# Patient Record
Sex: MALE | Race: WHITE | HISPANIC OR LATINO | Employment: FULL TIME | URBAN - METROPOLITAN AREA
[De-identification: names, ages, dates, MRNs, and addresses within clinical notes are randomized per-mention and may not be internally consistent; named-entity substitution may affect disease eponyms.]

---

## 2018-04-21 ENCOUNTER — HOSPITAL ENCOUNTER (EMERGENCY)
Facility: MEDICAL CENTER | Age: 23
End: 2018-04-21
Attending: EMERGENCY MEDICINE
Payer: OTHER MISCELLANEOUS

## 2018-04-21 VITALS
OXYGEN SATURATION: 95 % | SYSTOLIC BLOOD PRESSURE: 140 MMHG | BODY MASS INDEX: 24.36 KG/M2 | HEIGHT: 68 IN | WEIGHT: 160.72 LBS | RESPIRATION RATE: 16 BRPM | HEART RATE: 72 BPM | TEMPERATURE: 99 F | DIASTOLIC BLOOD PRESSURE: 73 MMHG

## 2018-04-21 DIAGNOSIS — S05.02XA ABRASION OF LEFT CORNEA, INITIAL ENCOUNTER: ICD-10-CM

## 2018-04-21 DIAGNOSIS — T15.02XA FOREIGN BODY OF LEFT CORNEA, INITIAL ENCOUNTER: ICD-10-CM

## 2018-04-21 PROCEDURE — 99284 EMERGENCY DEPT VISIT MOD MDM: CPT

## 2018-04-21 PROCEDURE — 700101 HCHG RX REV CODE 250

## 2018-04-21 RX ORDER — PROPARACAINE HYDROCHLORIDE 5 MG/ML
SOLUTION/ DROPS OPHTHALMIC
Status: COMPLETED
Start: 2018-04-21 | End: 2018-04-21

## 2018-04-21 RX ORDER — PROPARACAINE HYDROCHLORIDE 5 MG/ML
1 SOLUTION/ DROPS OPHTHALMIC ONCE
Status: COMPLETED | OUTPATIENT
Start: 2018-04-21 | End: 2018-04-21

## 2018-04-21 RX ADMIN — PROPARACAINE HYDROCHLORIDE 1 DROP: 5 SOLUTION/ DROPS OPHTHALMIC at 10:30

## 2018-04-21 RX ADMIN — FLUORESCEIN SODIUM: 1 STRIP OPHTHALMIC at 10:30

## 2018-04-21 ASSESSMENT — PAIN SCALES - GENERAL: PAINLEVEL_OUTOF10: 6

## 2018-04-21 NOTE — LETTER
"  FORM C-4:  EMPLOYEE’S CLAIM FOR COMPENSATION/ REPORT OF INITIAL TREATMENT  EMPLOYEE’S CLAIM - PROVIDE ALL INFORMATION REQUESTED   First Name  Robert Last Name  Dino Rader Birthdate             Age  1995 22 y.o. Sex  male Claim Number   Home Employee Address  [address]  [place]                                     Zip  [address] Height  1.727 m (5' 8\") Weight  72.9 kg (160 lb 11.5 oz) [facility]     Mailing Employee Address                           [address]   [place]               Zip  [address] Telephone  [phone] (home)  Primary Language Spoken  ENGLISH   Insurer  Unable to Obtain Third Party   MISC WORKERS COMP Employee's Occupation (Job Title) When Injury or Occupational Disease Occurred     Employer's Name  [employer] Telephone  [phone]    Employer Address  [address] [facility] [place]  [address]   Date of Injury  4/20/2018       Hour of Injury  9:00 AM Date Employer Notified  4/21/2018 Last Day of Work after Injury or Occupational Disease  4/20/2018 Supervisor to Whom Injury Reported  Vel Palenville   Address or Location of Accident (if applicable)  [[address] ]   What were you doing at the time of accident? (if applicable)  working    How did this injury or occupational disease occur? Be specific and answer in detail. Use additional sheet if necessary)  windy day   If you believe that you have an occupational disease, when did you first have knowledge of the disability and it relationship to your employment?  N/A Witnesses to the Accident  N/A     Nature of Injury or Occupational Disease  Workers' Compensation  Part(s) of Body Injured or Affected  Eye (L), N/A, N/A    I certify that the above is true and correct to the best of my knowledge and that I have provided this information in order to obtain the benefits of [place] or " [place]).  I hereby authorize any physician, chiropractor, surgeon, practitioner, or other person, any hospital, including [facility] or [facility] hospital, any medical service organization, any insurance company, or other institution or organization to release to each other, any medical or other information, including benefits paid or payable, pertinent to this injury or disease, except information relative to diagnosis, treatment and/or counseling for AIDS, psychological conditions, alcohol or controlled substances, for which I must give specific authorization.  A Photostat of this authorization shall be as valid as the original.   Date  04/21/2018 Place  [facility] Employee’s Signature   THIS REPORT MUST BE COMPLETED AND MAILED WITHIN 3 WORKING DAYS OF TREATMENT   Place  [facility], EMERGENCY DEPT  Name of Facility   [facility]   Date  4/21/2018 Diagnosis  (T15.02XA) Foreign body of left cornea, initial encounter  (S05.02XA) Abrasion of left cornea, initial encounter Is there evidence the injured employee was under the influence of alcohol and/or another controlled substance at the time of accident?   Hour  10:54 AM Description of Injury or Disease  Foreign body of left cornea, initial encounter  Abrasion of left cornea, initial encounter No   Treatment  Examination in the emergency department and the patient will now go directly to the ophthalmology office for further evaluation and treatment  Have you advised the patient to remain off work five days or more?         No   X-Ray Findings    Comments:n/a   If Yes   From Date    To Date      From information given by the employee, together with medical evidence, can you directly connect this injury or occupational disease as job incurred?  Yes If No, is the employee capable of: Full Duty  No Modified Duty  No   Is additional medical care by a physician  "indicated?  Yes  Comments:going directly to the ophthalmology office now If Modified Duty, Specify any Limitations / Restrictions  Must be evaluated by the ophthalmologist today to determine work status     Do you know of any previous injury or disease contributing to this condition or occupational disease?  No   Date  4/21/2018 Print Doctor’s Name  Pete Medellin certify the employer’s copy of this form was mailed on:   Address  [address] [facility] [address] [phone] Insurer’s Use Only   [facility]  [address]    Provider’s Tax ID Number  [id number] Telephone  Dept: [phone]    Doctor’s Signature  e-PETE Clayton M.D. Degree  MD    Original - TREATING PHYSICIAN OR CHIROPRACTOR   Pg 2-Insurer/TPA   Pg 3-Employer   Pg 4-Employee                                                                                                  Form C-4 (rev01/03)     BRIEF DESCRIPTION OF RIGHTS AND BENEFITS  (Pursuant to [place] 616C.050)    Notice of Injury or Occupational Disease (Incident Report Form C-1): If an injury or occupational disease (OD) arises out of and in the course of employment, you must provide written notice to your employer as soon as practicable, but no later than 7 days after the accident or OD. Your employer shall maintain a sufficient supply of the required forms.    Claim for Compensation (Form C-4): If medical treatment is sought, the form C-4 is available at the place of initial treatment. A completed \"Claim for Compensation\" (Form C-4) must be filed within 90 days after an accident or OD. The treating physician or chiropractor must, within 3 working days after treatment, complete and mail to the employer, the employer's insurer and third-party , the Claim for Compensation.    Medical Treatment: If you require medical treatment for your on-the-job injury or OD, you may be required to select a physician or chiropractor from a list provided by your workers’ " compensation insurer, if it has contracted with an Organization for Managed Care (MCO) or Preferred Provider Organization (PPO) or providers of health care. If your employer has not entered into a contract with an MCO or PPO, you may select a physician or chiropractor from the Panel of Physicians and Chiropractors. Any medical costs related to your industrial injury or OD will be paid by your insurer.    Temporary Total Disability (TTD): If your doctor has certified that you are unable to work for a period of at least 5 consecutive days, or 5 cumulative days in a 20-day period, or places restrictions on you that your employer does not accommodate, you may be entitled to TTD compensation.    Temporary Partial Disability (TPD): If the wage you receive upon reemployment is less than the compensation for TTD to which you are entitled, the insurer may be required to pay you TPD compensation to make up the difference. TPD can only be paid for a maximum of 24 months.    Permanent Partial Disability (PPD): When your medical condition is stable and there is an indication of a PPD as a result of your injury or OD, within 30 days, your insurer must arrange for an evaluation by a rating physician or chiropractor to determine the degree of your PPD. The amount of your PPD award depends on the date of injury, the results of the PPD evaluation and your age and wage.    Permanent Total Disability (PTD): If you are medically certified by a treating physician or chiropractor as permanently and totally disabled and have been granted a PTD status by your insurer, you are entitled to receive monthly benefits not to exceed 66 2/3% of your average monthly wage. The amount of your PTD payments is subject to reduction if you previously received a PPD award.    Vocational Rehabilitation Services: You may be eligible for vocational rehabilitation services if you are unable to return to the job due to a permanent physical impairment or  permanent restrictions as a result of your injury or occupational disease.    Transportation and Per Palmer Reimbursement: You may be eligible for travel expenses and per palmer associated with medical treatment.  Reopening: You may be able to reopen your claim if your condition worsens after claim closure.    Appeal Process: If you disagree with a written determination issued by the insurer or the insurer does not respond to your request, you may appeal to the Department of Administration, , by following the instructions contained in your determination letter. You must appeal the determination within 70 days from the date of the determination letter at [address], [address], [place], [place] [address], or [address], [address], [place], [place] [address]. If you disagree with the  decision, you may appeal to the Department of Administration, . You must file your appeal within 30 days from the date of the  decision letter at [address], [address], [place], [place] [address], or [address], [address], [place], [place] [address]. If you disagree with a decision of an , you may file a petition for judicial review with the District Court. You must do so within 30 days of the Appeal Officer’s decision. You may be represented by an  at your own expense or you may contact the [facility] for possible representation.    [place]  for Injured Workers (NAIW): If you disagree with a  decision, you may request that NAIW represent you without charge at an  Hearing. For information regarding denial of benefits, you may contact the [facility] at: [address], [address], [place] [address], [phone]) [phone], or [address], [facility] [address], [place] [address], [phone]    To File a Complaint with the Division: If you wish to file a complaint with the  of the  Division of Industrial Relations (DIR), please contact the Workers’ Compensation Section, [address], [address], [place], [place] [address], telephone (072) 135-6330, or [address], [address], [place], [place] [address], telephone (056) 650-3971.    For assistance with Workers’ Compensation Issues: you may contact the Office of the [facility] [employer] Health Assistance, [address] [place] [address], [address], [place], [place] [address], Toll Free 1-970.945.7964, Web site: http://[employer].[facility].nv., E-mail [email or web address].                                                                                                                                                                               __________________________________________________________________                                    04/21/2018            Employee Name / Signature                                                                                                                            Date                                       D-2 (rev. 10/07)

## 2018-04-21 NOTE — ED NOTES
ERP at bedside. Pt agrees with plan of care discussed by ERP. AIDET acknowledged with patient. Manpreet in low position, side rail up for pt safety. Call light within reach. Will continue to monitor.

## 2018-04-21 NOTE — ED NOTES
Discharge instructions provided.  Pt verbalized the understanding of discharge instructions to follow up with Opthalmology today, PCP and to return to ER if condition worsens.  Pt ambulated out of ER without difficulty.

## 2018-04-21 NOTE — ED PROVIDER NOTES
"ED Provider Note    CHIEF COMPLAINT  Chief Complaint   Patient presents with   • Foreign Body in Eye     rock in left eye since yesterday while at work       HPI  Robert Rader is a 22 y.o. male who presents to the emergency department with a foreign body in the left eye. 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 The patient has attempted to rinse his left eye with over the counter eye wash but he still feels that there is something in his eye today. Earlier today he went to a [place] urgent care and then was told to come to this emergency department. The patient does not wear glasses or contact lenses.    REVIEW OF SYSTEMS no right eye involvement no other injury or mechanism    PAST MEDICAL HISTORY  History reviewed. No pertinent past medical history.    FAMILY HISTORY  History reviewed. No pertinent family history.    SOCIAL HISTORY  Social History     Social History   • Marital status: N/A     Spouse name: N/A   • Number of children: N/A   • Years of education: N/A     Social History Main Topics   • Smoking status: Never Smoker   • Smokeless tobacco: Not on file   • Alcohol use Yes      Comment: sometimes   • Drug use: No   • Sexual activity: Not on file     Other Topics Concern   • Not on file     Social History Narrative   • No narrative on file       SURGICAL HISTORY  History reviewed. No pertinent surgical history.    CURRENT MEDICATIONS  Home Medications     Reviewed by Susie Alvares (Pharmacy Tech) on 04/21/18 at 1015  Med List Status: Complete   Medication Last Dose Status        Patient Neto Taking any Medications                       ALLERGIES  No Known Allergies    PHYSICAL EXAM  VITAL SIGNS: /77   Pulse 68   Temp 37.2 °C (99 °F)   Resp 19   Ht 1.727 m (5' 8\")   Wt 72.9 kg (160 lb 11.5 oz)   BMI 24.44 kg/m²    Constitutional: Awake well-appearing individual moderately uncomfortable  Eyes: The " left eye was examined with a slit lamp and I also inverted the upper and lower lids I didn't see any retained foreign bodies under the lids however on slit lamp examination I was able to see a black speck of dirt embedded into the cornea at approximately the 8:00 position overlying the iris. There is an area of surrounding corneal abrasion. I do not see any definite rupture or ulceration there is moderate injection of the eye    PROCEDURES  Proparacaine eyedrops were placed in the eye and this was very helpful but the patient said he was still not able to hold the eye open to read the eye chart. Using the slit lamp and an eye spud I attempted to remove the foreign body but was not successful it was very firmly embedded.    MEDICAL DECISION MAKING and DISPOSITION  I have reviewed the case with Dr. Stern who is on-call for ophthalmology today and he will meet the patient in his office in 15 minutes for further evaluation and treatment. I have reviewed this with the patient and he is agreeable with this plan. I have filled out a C4 work injury form indicating that his work status will have to be determined following his ophthalmology evaluation.    IMPRESSION  1. Embedded foreign body, left eye  2. Left eye corneal abrasion         Electronically signed by: Pete Medellin, 4/21/2018 10:46 AM

## 2019-03-07 ENCOUNTER — OCCUPATIONAL MEDICINE (OUTPATIENT)
Dept: URGENT CARE | Facility: PHYSICIAN GROUP | Age: 24
End: 2019-03-07
Payer: COMMERCIAL

## 2019-03-07 VITALS
TEMPERATURE: 98.6 F | SYSTOLIC BLOOD PRESSURE: 124 MMHG | WEIGHT: 150 LBS | BODY MASS INDEX: 21.47 KG/M2 | HEIGHT: 70 IN | HEART RATE: 72 BPM | OXYGEN SATURATION: 98 % | RESPIRATION RATE: 12 BRPM | DIASTOLIC BLOOD PRESSURE: 82 MMHG

## 2019-03-07 DIAGNOSIS — S05.02XA CORNEAL ABRASION, LEFT, INITIAL ENCOUNTER: ICD-10-CM

## 2019-03-07 PROCEDURE — 99203 OFFICE O/P NEW LOW 30 MIN: CPT | Mod: 29 | Performed by: PHYSICIAN ASSISTANT

## 2019-03-07 RX ORDER — ERYTHROMYCIN 5 MG/G
OINTMENT OPHTHALMIC
Qty: 1 TUBE | Refills: 0 | Status: SHIPPED | OUTPATIENT
Start: 2019-03-07

## 2019-03-07 ASSESSMENT — ENCOUNTER SYMPTOMS
PHOTOPHOBIA: 0
BLURRED VISION: 0
EYE REDNESS: 1
EYE PAIN: 1
DOUBLE VISION: 0

## 2019-03-07 NOTE — LETTER
[facility] [address] [address]  Phone:  [phone] - Fax:  [phone]   [facility] Progress Report and Disability Certification  Date of Service: 3/7/2019   No Show:  No  Date / Time of Next Visit: 3/10/2019   Claim Information   Patient Name: Robert Rader  Claim Number:     Employer:   [employer] Date of Injury: 3/7/2019     Insurer / TPA: [employer]  ID / SSN:     Occupation:   Diagnosis: The encounter diagnosis was Corneal abrasion, left, initial encounter.    Medical Information   Related to Industrial Injury? Yes    Subjective Complaints:  DOI: 3/7/2019.  PT presents to clinic with co foreign body in his left eye that blew into his eye over the top of his safety goggles. Pt states this occurred just prior to arrival today.  PT states he used some eye drops to see if he could flush it out, but no change.   States his eye will not stop watering so his vision is a little blurry but thinks it is from the tears.  PT does not wear glasses or contact lenses.  Denies second job.    Objective Findings: Left eye exam: fluorescein exam reveals    Pre-Existing Condition(s):     Assessment:   Initial Visit    Status: Additional Care Required  Permanent Disability:No    Plan: Medication    Diagnostics:      Comments:       Disability Information   Status: Released to Full Duty    From:  3/7/2019  Through: 3/10/2019 Restrictions are:     Physical Restrictions   Sitting:    Standing:    Stooping:    Bending:      Squatting:    Walking:    Climbing:    Pushing:      Pulling:    Other:    Reaching Above Shoulder (L):   Reaching Above Shoulder (R):       Reaching Below Shoulder (L):    Reaching Below Shoulder (R):      Not to exceed Weight Limits   Carrying(hrs):   Weight Limit(lb):   Lifting(hrs):   Weight  Limit(lb):     Comments: Use medications 3 times daily as prescribed.  Continue to wear safety glasses  while at work.     Repetitive Actions   Hands: i.e. Fine Manipulations from Grasping:     Feet: i.e. Operating Foot Controls:     Driving / Operate Machinery:     Physician Name: Sakshi Sanchez P.A.-C. Physician Signature: SAKSHI De Luna P.A.-C. e-Signature: Dr. Lee Cortes, Medical Director   Clinic Name / Location: [address] [address] Clinic Phone Number: Dept: [phone]   Appointment Time: 9:10 Am Visit Start Time: 9:29 AM   Check-In Time:  9:13 Am Visit Discharge Time:  1049AM   Original-Treating Physician or Chiropractor    Page 2-Insurer/TPA    Page 3-Employer    Page 4-Employee

## 2019-03-07 NOTE — LETTER
"EMPLOYEE’S CLAIM FOR COMPENSATION/ REPORT OF INITIAL TREATMENT  FORM C-4    EMPLOYEE’S CLAIM - PROVIDE ALL INFORMATION REQUESTED   First Name  Robert Last Name  Dino Rader Birthdate                    1995                Sex  male Claim Number   Home Address  [address] Age  23 y.o. Height  1.778 m (5' 10\") Weight  68 kg (150 lb) [facility]     [facility] Zip  [address] Telephone  [phone] (home)    Mailing Address  [address] [facility] [place]  [address] Primary Language Spoken  English    Insurer  [employer] Third Party   [facility]   Employee's Occupation (Job Title) When Injury or Occupational Disease Occurred      Employer's Name    [employer] Telephone   [phone]   Employer Address   [address]  [facility] Zip   [address]   Date of Injury  3/7/2019               Hour of Injury  7:30 AM Date Employer Notified  3/7/2019 Last Day of Work after Injury or Occupational Disease  3/7/2019 Supervisor to Whom Injury Reported  Vel   Address or Location of Accident (if applicable)  [[place] [address]]   What were you doing at the time of accident? (if applicable)  instaling [employer]    How did this injury or occupational disease occur? (Be specific an answer in detail. Use additional sheet if necessary)  it was windy, some dust got into my eye   If you believe that you have an occupational disease, when did you first have knowledge of the disability and it relationship to your employment?   Witnesses to the Accident  vel      Nature of Injury or Occupational Disease  Foreign Body  Part(s) of Body Injured or Affected  Eye (L), ,     I certify that the above is true and correct to the best of my knowledge and that I have provided this information in order to obtain the benefits of [place] " [place]  I hereby authorize any physician, chiropractor, surgeon, practitioner, or other person, any hospital, including [facility] or [facility], any medical service organization, any insurance company, or other institution or organization to release to each other, any medical or other information, including benefits paid or payable, pertinent to this injury or disease, except information relative to diagnosis, treatment and/or counseling for AIDS, psychological conditions, alcohol or controlled substances, for which I must give specific authorization.  A Photostat of this authorization shall be as valid as the original.     Date   Place   Employee’s Signature   THIS REPORT MUST BE COMPLETED AND MAILED WITHIN 3 WORKING DAYS OF TREATMENT   Place  [facility]  Name of Facility  [facility]   Date  3/7/2019 Diagnosis  (S05.02XA) Corneal abrasion, left, initial encounter Is there evidence the injured employee was under the influence of alcohol and/or another controlled substance at the time of accident?   Hour  9:29 AM Description of Injury or Disease  The encounter diagnosis was Corneal abrasion, left, initial encounter. No   Treatment  Erythromycin ointment to left eye 3 times daily as directed.  Continue to wear safety goggles/glasses at work.   Have you advised the patient to remain off work five days or more? No   X-Ray Findings      If Yes   From Date  To Date      From information given by the employee, together with medical evidence, can you directly connect this injury or occupational disease as job incurred?  Yes If No Full Duty  Yes Modified Duty      Is additional medical care by a physician indicated?  Yes If Modified Duty, Specify any Limitations / Restrictions      Do you know of any previous injury or disease contributing to this condition or occupational disease?                            No  "  Date  3/7/2019 Print Doctor’s Name Sakshi Sanchez P.A.-C. I certify the employer’s copy of  this form was mailed on:   Address  [address] Insurer’s Use Only     [facility] Zip  [address]    Provider’s Tax ID Number  [id number] Telephone  Dept: [phone]        e-SignSSAKSHI SERRANO P.A.-C.   e-Signature: Dr. Lee Cortes, Medical Director Degree  [place]OR[place]        ORIGINAL-TREATING PHYSICIAN OR CHIROPRACTOR    PAGE 2-INSURER/TPA    PAGE 3-EMPLOYER    PAGE 4-EMPLOYEE             Form C-4 (rev10/07)              BRIEF DESCRIPTION OF RIGHTS AND BENEFITS  (Pursuant to [place] 616C.050)    Notice of Injury or Occupational Disease (Incident Report Form C-1): If an injury or occupational disease (OD) arises out of and in the  course of employment, you must provide written notice to your employer as soon as practicable, but no later than 7 days after the accident or  OD. Your employer shall maintain a sufficient supply of the required forms.    Claim for Compensation (Form C-4): If medical treatment is sought, the form C-4 is available at the place of initial treatment. A completed  \"Claim for Compensation\" (Form C-4) must be filed within 90 days after an accident or OD. The treating physician or chiropractor must,  within 3 working days after treatment, complete and mail to the employer, the employer's insurer and third-party , the Claim for  Compensation.    Medical Treatment: If you require medical treatment for your on-the-job injury or OD, you may be required to select a physician or  chiropractor from a list provided by your workers’ compensation insurer, if it has contracted with an Organization for Managed Care (MCO) or  Preferred Provider Organization (PPO) or providers of health care. If your employer has not entered into a contract with an MCO or PPO, you  may select a physician or chiropractor from the Panel of Physicians and Chiropractors. Any medical costs related to your " industrial injury or  OD will be paid by your insurer.    Temporary Total Disability (TTD): If your doctor has certified that you are unable to work for a period of at least 5 consecutive days, or 5  cumulative days in a 20-day period, or places restrictions on you that your employer does not accommodate, you may be entitled to TTD  compensation.    Temporary Partial Disability (TPD): If the wage you receive upon reemployment is less than the compensation for TTD to which you are  entitled, the insurer may be required to pay you TPD compensation to make up the difference. TPD can only be paid for a maximum of 24  months.    Permanent Partial Disability (PPD): When your medical condition is stable and there is an indication of a PPD as a result of your injury or  OD, within 30 days, your insurer must arrange for an evaluation by a rating physician or chiropractor to determine the degree of your PPD. The  amount of your PPD award depends on the date of injury, the results of the PPD evaluation and your age and wage.    Permanent Total Disability (PTD): If you are medically certified by a treating physician or chiropractor as permanently and totally disabled  and have been granted a PTD status by your insurer, you are entitled to receive monthly benefits not to exceed 66 2/3% of your average  monthly wage. The amount of your PTD payments is subject to reduction if you previously received a PPD award.    Vocational Rehabilitation Services: You may be eligible for vocational rehabilitation services if you are unable to return to the job due to a  permanent physical impairment or permanent restrictions as a result of your injury or occupational disease.    Transportation and Per [place] Reimbursement: You may be eligible for travel expenses and per palmer associated with medical treatment.    Reopening: You may be able to reopen your claim if your condition worsens after claim closure.    Appeal Process: If you disagree with a  written determination issued by the insurer or the insurer does not respond to your request, you may  appeal to the Department of Administration, , by following the instructions contained in your determination letter. You must  appeal the determination within 70 days from the date of the determination letter at [address], [address], [place], [place]  [address], or [address], [address], [place], [place] [address]. If you disagree with the  decision, you may appeal to the  Department of Administration, . You must file your appeal within 30 days from the date of the  decision  letter at [address], [address], [place], [place] [address], or [address], [address], [place], [place] [address]. If you  disagree with a decision of an , you may file a petition for judicial review with the District Court. You must do so within 30  days of the Appeal Officer’s decision. You may be represented by an  at your own expense or you may contact the [facility] for possible  representation.    [place]  for Injured Workers (NAIW): If you disagree with a  decision, you may request that NAIW represent you  without charge at an  Hearing. For information regarding denial of benefits, you may contact the [facility] at: [address], [address], [place] [address], [phone], or [address], [place] [address], [phone]    To File a Complaint with the Division: If you wish to file a complaint with the  of the Division of Industrial Relations (DIR),  please contact the Workers’ Compensation Section, [address], [address], [place], [place] [address], telephone [phone]) [phone], or  [address], [address], [place], [place] [address], telephone [phone].    For assistance with Workers’ Compensation Issues: you may  contact the Office of the Governor Consumer Health Assistance, [address] [place] [address], [facility] [[address], [place], [place] [address], Toll Free 1-796.561.7294, Web site: http://govcha.[facility].nv.us, E-mail  [email or web address].                                                                                                                                                                                                                                   __________________________________________________________________                                                                   _________________                Employee Name / Signature                                                                                                                                                       Date                                                                                                                                                                                                     D-2 (rev. 10/07)

## 2019-03-07 NOTE — PROGRESS NOTES
"Subjective:      Robert Rader is a 23 y.o. male who presents with Eye Problem (dust in the left eye this AM, eye is a little red and watery )    Pt PMH, SocHx, SurgHx, FamHx, Drug allergies and medications reviewed with pt/EPIC.      Family history reviewed, it is not pertinent to this complaint.       DOI: 3/7/2019.  PT presents to clinic with co foreign body in his left eye that blew into his eye over the top of his safety goggles. Pt states this occurred just prior to arrival today.  PT states he used some eye drops to see if he could flush it out, but no change.   States his eye will not stop watering so his vision is a little blurry but thinks it is from the tears.  PT does not wear glasses or contact lenses.  Denies second job.      Work comp: FB sensation with persistent pain in left eye that occurred while at work today.       Eye Problem    Associated symptoms include eye redness. Pertinent negatives include no blurred vision, double vision or photophobia.       Review of Systems   Eyes: Positive for pain and redness. Negative for blurred vision, double vision and photophobia.   All other systems reviewed and are negative.         Objective:     /82 (BP Location: Left arm, Patient Position: Sitting, BP Cuff Size: Adult)   Pulse 72   Temp 37 °C (98.6 °F) (Temporal)   Resp 12   Ht 1.778 m (5' 10\")   Wt 68 kg (150 lb)   SpO2 98%   BMI 21.52 kg/m²      Physical Exam   Constitutional: He is oriented to person, place, and time. He appears well-developed and well-nourished. No distress.   HENT:   Head: Normocephalic and atraumatic.   Nose: Nose normal.   Mouth/Throat: Oropharynx is clear and moist.   Eyes: Pupils are equal, round, and reactive to light. EOM are normal. Lids are everted and swept, no foreign bodies found. No foreign body present in the left eye. Left conjunctiva is injected. No scleral icterus.   Slit lamp exam:       The left eye shows fluorescein uptake. The left eye shows no " foreign body.       Neck: Normal range of motion. Neck supple. No JVD present.   Cardiovascular: Normal rate, regular rhythm and normal heart sounds.    Pulmonary/Chest: Effort normal and breath sounds normal.   Abdominal: Soft.   Musculoskeletal: Normal range of motion.   Lymphadenopathy:     He has no cervical adenopathy.   Neurological: He is alert and oriented to person, place, and time.   Skin: Skin is warm and dry.       Left eye exam: fluorescein exam reveals corneal abrasion, no FB.        Assessment/Plan:     1. Corneal abrasion, left, initial encounter  erythromycin 5 MG/GM Ointment     PT can use cool/warm compress on affected area for relief of symptoms.     Follow D-39 instructions/restrictions. Return to clinic as scheduled.

## 2019-03-07 NOTE — PATIENT INSTRUCTIONS
Abrasión corneal  (Corneal Abrasion)  La córnea es la cubierta transparente en la parte anterior y central del linda. Cuando margarita la parte colorida del linda (iris), está mirando a través de la córnea. La córnea es un tejido muy calzada formado por varias capas. La capa superficial es felipe capa única de células (epitelio corneal) y es nathalie de los tejidos más sensibles del organismo. Si un rasguño o felipe lesión hacen que el epitelio corneal se desprenda, a esto se lo llama abrasión corneal. Si la lesión se extiende a los tejidos que se encuentran por debajo del epitelio, la afección se denomina úlcera corneal.  CAUSAS  · Rasguños.  · Traumatismos.  · Cuerpo extraño en el linda.  Algunas personas tienen recurrencias de abrasiones en la diamond de la lesión original incluso después de que esta ha sanado (síndrome de erosión recurrente). El síndrome de erosión recurrente generalmente mejora y desaparece con el tiempo.  SÍNTOMAS  · Dolor en el linda.  · Dificultad o imposibilidad de mantener abierto el linda lesionado.  · El linda está muy sensible a la mary ann.  · Las erosiones recurrentes tienden a ocurrir de manera repentina, a primera hora de la mañana, generalmente al despertar y abrir los ojos.  DIAGNÓSTICO  Holloway médico podrá diagnosticar felipe abrasión corneal analilia un examen ocular. Generalmente se coloca un tinte en el linda, usando un gotero o felipe pequeña griffin de papel humedecida con erickson lágrimas. Al examinar el linda con felipe mary ann especial, aparece claramente la abrasión destacada por el tinte.  TRATAMIENTO  · Las abrasiones pequeñas pueden tratarse con gotas o un ungüento con antibiótico.  · Es posible que le apliquen un parche de presión sobre el linda. Si ryan es el sonal, siga las instrucciones de holloway médico respecto de cuándo retirar el parche. No conduzca ni opere maquinaria mientras lleve puesto el parche. Es difícil juzgar las distancias en estas condiciones.  Si la abrasión se infecta y se disemina hacia tejidos más profundos de la  córnea, puede producirse felipe úlcera corneal. [place] es más grave porque puede causar felipe cicatriz en la córnea. Las cicatrices en la córnea interfieren con el paso bipin a través de esta y causan pérdida de la visión en el linda involucrado.  INSTRUCCIONES PARA EL CUIDADO EN EL HOGAR  · Utilice los medicamentos o el ungüento según lo indicado. Utilice los medicamentos de venta farrah o recetados para calmar el dolor, el malestar o la fiebre, según se lo indique el médico.  · No conduzca ni opere maquinarias mientras tenga el parche en el linda. En estas condiciones no puede juzgar correctamente las distancias.  · Si el médico le ha dado fecha para felipe visita de control, es importante que concurra. No cumplir con las visitas de control puede stanley shelley resultado felipe infección grave en el linda o felipe pérdida permanente de la visión. Si hay algún problema que le impide acudir a la ruthie, avísele a holloway médico.  SOLICITE ATENCIÓN MÉDICA SI:  · Siente dolor, tiene sensibilidad a la mary ann y experimenta felipe sensación de picazón en un linda o en ambos.  · El parche de presión se afloja continuamente y puede parpadear debajo del parche después del tratamiento.  · Aparece algún tipo de secreción en el linda después del tratamiento o si despierta con los párpados pegados por la mañana.  · Por la mañana, siente los mismos síntomas que sintió en los corina en que tuvo la abrasión original, algunas semanas o meses después de la curación.  Esta información no tiene shelley fin reemplazar el consejo del médico. Asegúrese de hacerle al médico cualquier pregunta que tenga.  Document Released: 12/18/2006 Document Revised: 04/10/2017 Document Reviewed: 08/25/2014  Elsevier Interactive Patient Education © 2017 Elsevier Inc.

## 2019-03-10 ENCOUNTER — OCCUPATIONAL MEDICINE (OUTPATIENT)
Dept: URGENT CARE | Facility: PHYSICIAN GROUP | Age: 24
End: 2019-03-10
Payer: COMMERCIAL

## 2019-03-10 VITALS
WEIGHT: 150 LBS | DIASTOLIC BLOOD PRESSURE: 82 MMHG | RESPIRATION RATE: 16 BRPM | HEART RATE: 61 BPM | TEMPERATURE: 98 F | BODY MASS INDEX: 21.52 KG/M2 | OXYGEN SATURATION: 97 % | SYSTOLIC BLOOD PRESSURE: 112 MMHG

## 2019-03-10 DIAGNOSIS — S05.02XA CORNEAL ABRASION, LEFT, INITIAL ENCOUNTER: ICD-10-CM

## 2019-03-10 PROCEDURE — 99213 OFFICE O/P EST LOW 20 MIN: CPT | Mod: 29 | Performed by: PHYSICIAN ASSISTANT

## 2019-03-10 ASSESSMENT — ENCOUNTER SYMPTOMS
DOUBLE VISION: 0
BLURRED VISION: 0
NAUSEA: 0
VOMITING: 0
EYE DISCHARGE: 0
EYE PAIN: 0
EYE REDNESS: 0
PHOTOPHOBIA: 0

## 2019-03-10 NOTE — PROGRESS NOTES
Subjective:   Robert Rader is a 23 y.o. male who presents for Eye Injury (WC f/v left injury,feeling better)       Pt PMH, SocHx, SurgHx, FamHx, Drug allergies and medications reviewed with [facility].      Family history reviewed, it is not pertinent to this complaint.         DOI: 3/7/2019.  PT presents to clinic with co foreign body in his left eye that blew into his eye over the top of his safety goggles. He was diagnosed with a corneal abrasion and placed on abx.  He has been performing full duties at work.  His eye is no longer watering. Denies redness, blurry vision, and discharge. He is wearing protective glasses at work.  Denies second job.             Review of Systems   Eyes: Negative for blurred vision, double vision, photophobia, pain, discharge and redness.   Gastrointestinal: Negative for nausea and vomiting.       PMH:  has no past medical history on file.  MEDS:   Current Outpatient Prescriptions:   •  erythromycin 5 MG/GM Ointment, Pt to apply 1/4 inch ribbon to lower lid of left eye 3 times daily x 5 days., Disp: 1 Tube, Rfl: 0  ALLERGIES: No Known Allergies  SURGHX: No past surgical history on file.  SOCHX:  reports that he has never smoked. He has never used smokeless tobacco. He reports that he drinks alcohol. He reports that he does not use drugs.  FH: Family history was reviewed, no pertinent findings to report   Objective:   /82 (BP Location: Left arm, Patient Position: Sitting, BP Cuff Size: Adult)   Pulse 61   Temp 36.7 °C (98 °F)   Resp 16   Wt 68 kg (150 lb)   SpO2 97%   BMI 21.52 kg/m²   Physical Exam   Constitutional: He appears well-developed and well-nourished.  Non-toxic appearance. No distress.   HENT:   Head: Normocephalic and atraumatic.   Right Ear: External ear normal.   Left Ear: External ear normal.   Nose: Nose normal.   Eyes: Pupils are equal, round, and reactive to light. Conjunctivae and lids are normal. Right conjunctiva has no hemorrhage. Left conjunctiva  has no hemorrhage. Right eye exhibits normal extraocular motion and no nystagmus. Left eye exhibits normal extraocular motion and no nystagmus.   Very mild right sided conjunctival injection. No evidence of corneal abrasion or foreign body on gross exam.   Neck: Neck supple.   Pulmonary/Chest: Effort normal. No respiratory distress.   Neurological: He is alert.   Skin: Skin is warm and dry. Capillary refill takes less than 2 seconds.   Psychiatric: He has a normal mood and affect. His speech is normal and behavior is normal. Judgment and thought content normal. Cognition and memory are normal.   Vitals reviewed.      Assessment/Plan:   1. Corneal abrasion, left, initial encounter    Differential diagnosis, natural history, supportive care, and indications for immediate follow-up discussed.

## 2019-03-10 NOTE — LETTER
[facility] [address] [address]  Phone:  [phone] - Fax:  [phone]   [facility] Progress Report and Disability Certification  Date of Service: 3/10/2019   No Show:  No  Date / Time of Next Visit:     Claim Information   Patient Name: Robert Rader  Claim Number:     Employer:   [employer] Date of Injury: 3/7/2019     Insurer / TPA: [employer] Workers [employer]  ID / SSN:     Occupation:   Diagnosis: The encounter diagnosis was Corneal abrasion, left, initial encounter.    Medical Information   Related to Industrial Injury? Yes    Subjective Complaints:  DOI: 3/7/2019.  PT presents to clinic with co foreign body in his left eye that blew into his eye over the top of his safety goggles. He was diagnosed with a corneal abrasion and placed on abx.  He has been performing full duties at work.  His eye is no longer watering. Denies redness, blurry vision, and discharge. He is wearing protective glasses at work.  Denies second job.    Objective Findings: Constitutional: He appears well-developed and well-nourished.  Non-toxic appearance. No distress.   HENT:   Head: Normocephalic and atraumatic.   Right Ear: External ear normal.   Left Ear: External ear normal.   Nose: Nose normal.   Eyes: Pupils are equal, round, and reactive to light. Conjunctivae and lids are normal. Right conjunctiva has no hemorrhage. Left conjunctiva has no hemorrhage. Right eye exhibits normal extraocular motion and no nystagmus. Left eye exhibits normal extraocular motion and no nystagmus.   Very mild right sided conjunctival injection. No evidence of corneal abrasion or foreign body on gross exam.   Neck: Neck supple.   Pulmonary/Chest: Effort normal. No respiratory distress.   Neurological: He is alert.   Skin: Skin is warm and dry. Capillary refill takes less than 2 seconds.   Psychiatric: He has a normal mood and affect. His speech is normal and  behavior is normal. Judgment and thought content normal. Cognition and memory are normal.   Vitals reviewed.   Pre-Existing Condition(s):     Assessment:   Condition Improved    Status: Discharged /  MMI  Permanent Disability:No    Plan:      Diagnostics:      Comments:  Sx fully resolved. May discontinue abx.  Continue to wear eye pro at work.    Disability Information   Status: Released to Full Duty    From:     Through:   Restrictions are:     Physical Restrictions   Sitting:    Standing:    Stooping:    Bending:      Squatting:    Walking:    Climbing:    Pushing:      Pulling:    Other:    Reaching Above Shoulder (L):   Reaching Above Shoulder (R):       Reaching Below Shoulder (L):    Reaching Below Shoulder (R):      Not to exceed Weight Limits   Carrying(hrs):   Weight Limit(lb):   Lifting(hrs):   Weight  Limit(lb):     Comments:      Repetitive Actions   Hands: i.e. Fine Manipulations from Grasping:     Feet: i.e. Operating Foot Controls:     Driving / Operate Machinery:     Physician Name: Eduardo Ruiz Physician Signature: EDUARDO Mitchell  e-Signature: Dr. Lee Cortes, Medical Director   Clinic Name / Location: [address] [address] Clinic Phone Number: Dept: [phone]   Appointment Time: 11:00 Am Visit Start Time: 11:11 AM   Check-In Time:  11:03 Am Visit Discharge Time:  11:30 AM   Original-Treating Physician or Chiropractor    Page 2-Insurer/TPA    Page 3-Employer    Page 4-Employee